# Patient Record
Sex: MALE | Race: WHITE | URBAN - METROPOLITAN AREA
[De-identification: names, ages, dates, MRNs, and addresses within clinical notes are randomized per-mention and may not be internally consistent; named-entity substitution may affect disease eponyms.]

---

## 2019-06-06 ENCOUNTER — TELEPHONE (OUTPATIENT)
Dept: TRANSPLANT | Facility: CLINIC | Age: 74
End: 2019-06-06

## 2019-06-06 DIAGNOSIS — Z00.5 TRANSPLANT DONOR EVALUATION: Primary | ICD-10-CM

## 2019-06-06 NOTE — TELEPHONE ENCOUNTER
"[facility] BREEZE  [id number]      LIVING KIDNEY DONOR EVALUATION  Donor First Name Jose Maria Donor MRN    Donor Last Name May Completed 5/15/2019 11:13 PM    1945 Record ID [id number]   BREEZE Screen PASSED     Intended Recipient  Recipient First Name Anastasiya Recipient MRN    Recipient Last Name Ordaz Relationship Close Friend   Recipient  1954 Recipient Diagnosis    Recipient's ABO      Donor Information  Age 74 Gender Male   Ht 173 cm (5' 8'') Race ,    Wt 83.9 kg (185 lbs) Ethnicity Not /   BMI 28.10 kg/m  Preferred Language English      Required No     Blood Type O   Demographics  Home Address [address] [place] [address] # [phone]   [facility] # [phone]   Zip Code [address] Type Mobile   Country [place] Preferred Contact day Mon, Fri, Thur, Wed, Tue   Email [email or web address] Preferred Contact time 11:00 AM-1:00 PM, 1:00 PM-4:00 PM, 09:00 AM-11:00 AM   &&   Donor's Medical Information  Medical History Erectile Dysfunction   Hypercholesterolemia   Hypertension   other (\"81 mg preventive\") Medications Acidophilus   Aspirin   Fish Oil   Irbesartan   Multivitamin   Simvastatin   Vitamin C   Surgical History None Reported Allergies NKDA   Social History EtOH: Rare (1-2 drinks/month)   Illicit Drug Use: Denies   Tobacco: Denies Self-Reported Functional Status \"I am able to participate in strenuous sports such as swimming, singles tennis, football, basketball, or skiing\"   Family Medical History Cancer (Mother)   Diabetes (Sibling, Mother)   Heart Disease (denies)   Hypertension (Mother, Father)   Kidney Disease (denies)   Kidney Stones (denies) Exercise Frequency Exercise (>3X per week)   Review of Organ Systems  Review of Systems Airway or Lungs: No   Blood Disorder: No   Cancer: No   Diabetes,Thyroid,Adrenal,Endocrine Disorder: No   Digestive or Liver: No   Heart or Circulatory System: No   Immune " Diseases: No   Kidneys and Bladder: No   Male Health: Yes   Muscles,Bones,Joints: No   Neuro: No   Psych: No   &&   Donor's Social Information  Marital Status  Living Accommodation Owns own home/apartment   Level of Education College or baccalaureate degree complete Living Arrangement With spouse   Employment Status Unemployed Concerns: health and life insurance No   Employer  Concerns: job security and lost income No   Occupation      Medical Insurance Status Has medical insurance     High Risk Behavior  High Risk Behaviors Blood transfusion < 12 months. (NO)   Commercial sex < 12 months. (NO)   Illicit IV drug use < 5yrs. (NO)   Male:male sexual contact < 5yrs. (NO)   Other high risk sexual contact < 12 months. (NO)   Reason for Donation  Referral Tx Candidate Reason for Donation Personal friend   Permission to Disclose Inquiry Yes Patient Comments Not at this time   Donor Motivation Level Highly motivated donor     PCP Contact  PCP Name Shanae Nicollet   PCP [facility]   PCP Phone [phone]   Emergency Contact  First Name Jaleesa First Name Tramaine   Last Name May Last Name May   Phone # [phone] Phone # [phone]   Phone Type Work - Shared Phone Type Home   Relationship Spouse Relationship Sibling   Office Use  Reviewed By    Reviewed 6/6/2019 9:36 AM   Admin Folder Archive   Comments    Lost for Followup    Extended Comments    [facility] ID [email or web address]:[id number] survey status completed   Activity History  Call  Task    Due Date 6/6/2019   Last Modified Date/Time 6/6/2019 9:20 AM   Comments    Call  Task    Due Date 5/22/2019   Last Modified Date/Time 5/22/2019 2:54 PM   Comments    Call  Task    Due Date 5/16/2019   Last Modified Date/Time 5/16/2019 10:33 AM   Comments

## 2019-06-06 NOTE — TELEPHONE ENCOUNTER
Is abo O. Has had HTN for 5yrs.Med he's taking controls it well. Will go to a [facility] for labs.Sent info/forms.